# Patient Record
Sex: MALE | Race: WHITE | URBAN - METROPOLITAN AREA
[De-identification: names, ages, dates, MRNs, and addresses within clinical notes are randomized per-mention and may not be internally consistent; named-entity substitution may affect disease eponyms.]

---

## 2020-01-06 ENCOUNTER — TELEPHONE (OUTPATIENT)
Dept: GASTROENTEROLOGY | Facility: CLINIC | Age: 50
End: 2020-01-06

## 2020-01-09 NOTE — TELEPHONE ENCOUNTER
Called pcp's office to update records- had the same home address and phone number, but had an alternate cell number  Tried cell, in service; left message regarding recall

## 2020-02-07 NOTE — TELEPHONE ENCOUNTER
Dr Landeros Credit was an [facility] pt-pt has been contacted to schedule recall colon with no response-please advise   Thank you